# Patient Record
Sex: MALE | Race: WHITE | URBAN - METROPOLITAN AREA
[De-identification: names, ages, dates, MRNs, and addresses within clinical notes are randomized per-mention and may not be internally consistent; named-entity substitution may affect disease eponyms.]

---

## 2017-02-04 ENCOUNTER — APPOINTMENT (OUTPATIENT)
Dept: CT IMAGING | Facility: CLINIC | Age: 47
End: 2017-02-04
Attending: EMERGENCY MEDICINE

## 2017-02-04 ENCOUNTER — HOSPITAL ENCOUNTER (EMERGENCY)
Facility: CLINIC | Age: 47
Discharge: HOME | End: 2017-02-04
Attending: EMERGENCY MEDICINE | Admitting: EMERGENCY MEDICINE

## 2017-02-04 VITALS
OXYGEN SATURATION: 95 % | DIASTOLIC BLOOD PRESSURE: 108 MMHG | TEMPERATURE: 97.1 F | HEART RATE: 104 BPM | SYSTOLIC BLOOD PRESSURE: 178 MMHG | RESPIRATION RATE: 18 BRPM

## 2017-02-04 DIAGNOSIS — R19.7 DIARRHEA, UNSPECIFIED TYPE: ICD-10-CM

## 2017-02-04 DIAGNOSIS — F15.10 METHAMPHETAMINE ABUSE (H): ICD-10-CM

## 2017-02-04 LAB
ALBUMIN SERPL-MCNC: 3.5 G/DL (ref 3.4–5)
ALBUMIN UR-MCNC: NEGATIVE MG/DL
ALP SERPL-CCNC: 89 U/L (ref 40–150)
ALT SERPL W P-5'-P-CCNC: 34 U/L (ref 0–70)
AMPHETAMINES UR QL SCN: ABNORMAL
ANION GAP SERPL CALCULATED.3IONS-SCNC: 6 MMOL/L (ref 3–14)
APPEARANCE UR: CLEAR
AST SERPL W P-5'-P-CCNC: 18 U/L (ref 0–45)
BARBITURATES UR QL: ABNORMAL
BASOPHILS # BLD AUTO: 0 10E9/L (ref 0–0.2)
BASOPHILS NFR BLD AUTO: 0.2 %
BENZODIAZ UR QL: ABNORMAL
BILIRUB SERPL-MCNC: 0.3 MG/DL (ref 0.2–1.3)
BILIRUB UR QL STRIP: NEGATIVE
BUN SERPL-MCNC: 11 MG/DL (ref 7–30)
CALCIUM SERPL-MCNC: 8.4 MG/DL (ref 8.5–10.1)
CANNABINOIDS UR QL SCN: ABNORMAL
CHLORIDE SERPL-SCNC: 105 MMOL/L (ref 94–109)
CO2 SERPL-SCNC: 27 MMOL/L (ref 20–32)
COCAINE UR QL: ABNORMAL
COLOR UR AUTO: YELLOW
CREAT SERPL-MCNC: 1.16 MG/DL (ref 0.66–1.25)
DIFFERENTIAL METHOD BLD: NORMAL
EOSINOPHIL # BLD AUTO: 0.2 10E9/L (ref 0–0.7)
EOSINOPHIL NFR BLD AUTO: 2.3 %
ERYTHROCYTE [DISTWIDTH] IN BLOOD BY AUTOMATED COUNT: 13.4 % (ref 10–15)
GFR SERPL CREATININE-BSD FRML MDRD: 67 ML/MIN/1.7M2
GLUCOSE SERPL-MCNC: 88 MG/DL (ref 70–99)
GLUCOSE UR STRIP-MCNC: NEGATIVE MG/DL
HCT VFR BLD AUTO: 45.8 % (ref 40–53)
HGB BLD-MCNC: 15.8 G/DL (ref 13.3–17.7)
HGB UR QL STRIP: NEGATIVE
IMM GRANULOCYTES # BLD: 0.1 10E9/L (ref 0–0.4)
IMM GRANULOCYTES NFR BLD: 0.6 %
KETONES UR STRIP-MCNC: NEGATIVE MG/DL
LACTATE BLD-SCNC: 1.9 MMOL/L (ref 0.7–2.1)
LEUKOCYTE ESTERASE UR QL STRIP: NEGATIVE
LIPASE SERPL-CCNC: 126 U/L (ref 73–393)
LYMPHOCYTES # BLD AUTO: 1.5 10E9/L (ref 0.8–5.3)
LYMPHOCYTES NFR BLD AUTO: 14.4 %
MCH RBC QN AUTO: 30.6 PG (ref 26.5–33)
MCHC RBC AUTO-ENTMCNC: 34.5 G/DL (ref 31.5–36.5)
MCV RBC AUTO: 89 FL (ref 78–100)
MONOCYTES # BLD AUTO: 1 10E9/L (ref 0–1.3)
MONOCYTES NFR BLD AUTO: 9.4 %
NEUTROPHILS # BLD AUTO: 7.6 10E9/L (ref 1.6–8.3)
NEUTROPHILS NFR BLD AUTO: 73.1 %
NITRATE UR QL: NEGATIVE
OPIATES UR QL SCN: ABNORMAL
PCP UR QL SCN: ABNORMAL
PH UR STRIP: 5.5 PH (ref 5–7)
PLATELET # BLD AUTO: 234 10E9/L (ref 150–450)
POTASSIUM SERPL-SCNC: 4 MMOL/L (ref 3.4–5.3)
PROT SERPL-MCNC: 6.9 G/DL (ref 6.8–8.8)
RBC # BLD AUTO: 5.16 10E12/L (ref 4.4–5.9)
SODIUM SERPL-SCNC: 138 MMOL/L (ref 133–144)
SP GR UR STRIP: 1.01 (ref 1–1.03)
URN SPEC COLLECT METH UR: NORMAL
UROBILINOGEN UR STRIP-MCNC: NORMAL MG/DL (ref 0–2)
WBC # BLD AUTO: 10.4 10E9/L (ref 4–11)

## 2017-02-04 PROCEDURE — 25500064 ZZH RX 255 OP 636: Performed by: EMERGENCY MEDICINE

## 2017-02-04 PROCEDURE — 81003 URINALYSIS AUTO W/O SCOPE: CPT | Performed by: EMERGENCY MEDICINE

## 2017-02-04 PROCEDURE — 25000128 H RX IP 250 OP 636: Performed by: EMERGENCY MEDICINE

## 2017-02-04 PROCEDURE — 96360 HYDRATION IV INFUSION INIT: CPT

## 2017-02-04 PROCEDURE — 83605 ASSAY OF LACTIC ACID: CPT | Performed by: EMERGENCY MEDICINE

## 2017-02-04 PROCEDURE — 99284 EMERGENCY DEPT VISIT MOD MDM: CPT | Mod: 25

## 2017-02-04 PROCEDURE — 74177 CT ABD & PELVIS W/CONTRAST: CPT

## 2017-02-04 PROCEDURE — 80053 COMPREHEN METABOLIC PANEL: CPT | Performed by: EMERGENCY MEDICINE

## 2017-02-04 PROCEDURE — 99284 EMERGENCY DEPT VISIT MOD MDM: CPT | Performed by: EMERGENCY MEDICINE

## 2017-02-04 PROCEDURE — 85025 COMPLETE CBC W/AUTO DIFF WBC: CPT | Performed by: EMERGENCY MEDICINE

## 2017-02-04 PROCEDURE — 25000125 ZZHC RX 250: Performed by: EMERGENCY MEDICINE

## 2017-02-04 PROCEDURE — 80307 DRUG TEST PRSMV CHEM ANLYZR: CPT | Performed by: EMERGENCY MEDICINE

## 2017-02-04 PROCEDURE — 83690 ASSAY OF LIPASE: CPT | Performed by: EMERGENCY MEDICINE

## 2017-02-04 RX ORDER — IOPAMIDOL 755 MG/ML
100 INJECTION, SOLUTION INTRAVASCULAR ONCE
Status: COMPLETED | OUTPATIENT
Start: 2017-02-04 | End: 2017-02-04

## 2017-02-04 RX ORDER — SODIUM CHLORIDE 9 MG/ML
1000 INJECTION, SOLUTION INTRAVENOUS CONTINUOUS
Status: DISCONTINUED | OUTPATIENT
Start: 2017-02-04 | End: 2017-02-04 | Stop reason: HOSPADM

## 2017-02-04 RX ADMIN — SODIUM CHLORIDE 67 ML: 9 INJECTION, SOLUTION INTRAVENOUS at 20:16

## 2017-02-04 RX ADMIN — IOPAMIDOL 100 ML: 755 INJECTION, SOLUTION INTRAVENOUS at 20:16

## 2017-02-04 RX ADMIN — SODIUM CHLORIDE 1000 ML: 9 INJECTION, SOLUTION INTRAVENOUS at 17:55

## 2017-02-04 NOTE — ED AVS SNAPSHOT
[facility] Emergency Department    [address] [address]    Phone:  [phone]    Fax:  [phone]                                       Xavier Arellano   MRN: [id number]    Department:  [facility] Emergency Department   Date of Visit:  2/4/2017           Patient Information     Date Of Birth          1970        Your diagnoses for this visit were:     Diarrhea, unspecified type     Methamphetamine abuse        You were seen by George Peterson MD.      Follow-up Information     Follow up with None.    Why:  As needed      Discharge References/Attachments     DIARRHEA, UNK CAUSE (ADULT) (ENGLISH)    METHAMPHETAMINE ABUSE AND ADDICTION, UNDERSTANDING (ENGLISH)      24 Hour Appointment Hotline       To make an appointment at any [facility] clinic, call [phone]). If you don't have a family doctor or clinic, we will help you find one. [facility] clinics are conveniently located to serve the needs of you and your family.             Review of your medicines      Notice     You have not been prescribed any medications.            Procedures and tests performed during your visit     CBC with platelets differential    CT Abdomen Pelvis w Contrast    Comprehensive metabolic panel    Drug abuse screen 77 urine ([place])    Lactic acid whole blood    Lipase    UA reflex to Microscopic      Orders Needing Specimen Collection     Ordered          02/04/17 1732  Enteric Bacteria and Virus Panel by MORRIS Stool - ROUTINE, Prio: Routine, Needs to be Collected     Scheduled Task Status   02/04/17 1733 Collect Enteric Bacteria and Virus Panel by MORRIS Stool Open   Order Class:  PCU Collect                02/04/17 1732  Clostridium difficile toxin B PCR - ROUTINE, Prio: Routine, Needs to be Collected     Scheduled Task Status   02/04/17 1733 Collect Clostridium difficile toxin B PCR Open   Order Class:  PCU Collect                  Pending Results     Date and Time Order Name Status  Description    2/4/2017 1937 CT Abdomen Pelvis w Contrast Preliminary             Pending Culture Results     No orders found from 2/3/2017 to 2/5/2017.       Test Results from your hospital stay           2/4/2017  6:14 PM - Interface, Flexilab Results      Component Results     Component Value Ref Range & Units Status    WBC 10.4 4.0 - 11.0 10e9/L Final    RBC Count 5.16 4.4 - 5.9 10e12/L Final    Hemoglobin 15.8 13.3 - 17.7 g/dL Final    Hematocrit 45.8 40.0 - 53.0 % Final    MCV 89 78 - 100 fl Final    MCH 30.6 26.5 - 33.0 pg Final    MCHC 34.5 31.5 - 36.5 g/dL Final    RDW 13.4 10.0 - 15.0 % Final    Platelet Count 234 150 - 450 10e9/L Final    Diff Method Automated Method  Final    % Neutrophils 73.1 % Final    % Lymphocytes 14.4 % Final    % Monocytes 9.4 % Final    % Eosinophils 2.3 % Final    % Basophils 0.2 % Final    % Immature Granulocytes 0.6 % Final    Absolute Neutrophil 7.6 1.6 - 8.3 10e9/L Final    Absolute Lymphocytes 1.5 0.8 - 5.3 10e9/L Final    Absolute Monocytes 1.0 0.0 - 1.3 10e9/L Final    Absolute Eosinophils 0.2 0.0 - 0.7 10e9/L Final    Absolute Basophils 0.0 0.0 - 0.2 10e9/L Final    Abs Immature Granulocytes 0.1 0 - 0.4 10e9/L Final         2/4/2017  6:31 PM - Interface, Flexilab Results      Component Results     Component Value Ref Range & Units Status    Sodium 138 133 - 144 mmol/L Final    Potassium 4.0 3.4 - 5.3 mmol/L Final    Chloride 105 94 - 109 mmol/L Final    Carbon Dioxide 27 20 - 32 mmol/L Final    Anion Gap 6 3 - 14 mmol/L Final    Glucose 88 70 - 99 mg/dL Final    Urea Nitrogen 11 7 - 30 mg/dL Final    Creatinine 1.16 0.66 - 1.25 mg/dL Final    GFR Estimate 67 >60 mL/min/1.7m2 Final    Non  GFR Calc    GFR Estimate If Black 81 >60 mL/min/1.7m2 Final    African American GFR Calc    Calcium 8.4 (L) 8.5 - 10.1 mg/dL Final    Bilirubin Total 0.3 0.2 - 1.3 mg/dL Final    Albumin 3.5 3.4 - 5.0 g/dL Final    Protein Total 6.9 6.8 - 8.8 g/dL Final    Alkaline  Phosphatase 89 40 - 150 U/L Final    ALT 34 0 - 70 U/L Final    AST 18 0 - 45 U/L Final         2/4/2017  6:29 PM - Interface, Flexilab Results      Component Results     Component Value Ref Range & Units Status    Lipase 126 73 - 393 U/L Final         2/4/2017  6:19 PM - Interface, Flexilab Results      Component Results     Component Value Ref Range & Units Status    Lactic Acid 1.9 0.7 - 2.1 mmol/L Final         2/4/2017  7:21 PM - Interface, Flexilab Results      Component Results     Component Value Ref Range & Units Status    Color Urine Yellow  Final    Appearance Urine Clear  Final    Glucose Urine Negative NEG mg/dL Final    Bilirubin Urine Negative NEG Final    Ketones Urine Negative NEG mg/dL Final    Specific Gravity Urine 1.015 1.003 - 1.035 Final    Blood Urine Negative NEG Final    pH Urine 5.5 5.0 - 7.0 pH Final    Protein Albumin Urine Negative NEG mg/dL Final    Urobilinogen mg/dL Normal 0.0 - 2.0 mg/dL Final    Nitrite Urine Negative NEG Final    Leukocyte Esterase Urine Negative NEG Final    Source Midstream Urine  Final         2/4/2017  7:34 PM - Interface, Flexilab Results      Component Results     Component Value Ref Range & Units Status    Amphetamine Qual Urine  NEG Final    Positive   Cutoff for a positive amphetamine is greater than 500 ng/mL. This is an   unconfirmed screening result to be used for medical purposes only.   (A)    Barbiturates Qual Urine  NEG Final    Negative   Cutoff for a negative barbiturate is 200 ng/mL or less.      Benzodiazepine Qual Urine  NEG Final    Negative   Cutoff for a negative benzodiazepine is 200 ng/mL or less.      Cannabinoids Qual Urine  NEG Final    Negative   Cutoff for a negative cannabinoid is 50 ng/mL or less.      Cocaine Qual Urine  NEG Final    Negative   Cutoff for a negative cocaine is 300 ng/mL or less.      Opiates Qualitative Urine  NEG Final    Negative   Cutoff for a negative opiate is 300 ng/mL or less.      PCP Qual Urine  NEG Final     Negative   Cutoff for a negative PCP is 25 ng/mL or less.           2/4/2017  8:35 PM - Interface, Radiant Ib      Narrative     CT ABDOMEN AND PELVIS WITH CONTRAST 2/4/2017 8:24 PM     HISTORY: Diarrhea with left lower quadrant and suprapubic abdominal  pain.    COMPARISON: None.    TECHNIQUE: Volumetric helical acquisition of CT images from the lung  bases through the symphysis pubis after the administration of Isovue  370 - 100 mL intravenous contrast. Radiation dose for this scan was  reduced using automated exposure control, adjustment of the mA and/or  kV according to patient size, or iterative reconstruction technique.    FINDINGS: The liver, bilateral kidneys and adrenal glands, pancreas,  and spleen demonstrate no worrisome focal lesion. Mild fatty changes  of liver. Gallbladder appears unremarkable. There are minimal  atherosclerotic changes of the visualized aorta and its branches.  There is no evidence of aortic dissection or aneurysm. There is  minimal scattered diverticulosis without evidence for diverticulitis.  Normal appendix. No hydronephrosis. Small fat-containing periumbilical  hernia without stranding. There is no free fluid in the abdomen or  pelvis. No free air in the abdomen. Bone windows reveal no destructive  lesions. There are no abdominal or pelvic lymph nodes that are  abnormal by size criteria. The visualized lung bases are unremarkable.  There are no dilated loops of small bowel or colon.        Impression     IMPRESSION: No acute process demonstrated within the abdomen and  pelvis.                   Thank you for choosing [facility]       Thank you for choosing [facility] for your care. Our goal is always to provide you with excellent care. Hearing back from our patients is one way we can continue to improve our services. Please take a few minutes to complete the written survey that you may receive in the mail after you visit with us. Thank you!        MyChart Information     [employer]  "lets you send messages to your doctor, view your test results, renew your prescriptions, schedule appointments and more. To sign up, go to [email or web address] . Click on \"Log in\" on the left side of the screen, which will take you to the Welcome page. Then click on \"Sign up Now\" on the right side of the page.     You will be asked to enter the access code listed below, as well as some personal information. Please follow the directions to create your username and password.     Your access code is: [email or web address]  Expires: 2017  8:49 PM     Your access code will  in 90 days. If you need help or a new code, please call your [facility] clinic or [phone].        Care EveryWhere ID     This is your Care EveryWhere ID. This could be used by other organizations to access your [facility] medical records  [id number]        After Visit Summary       This is your record. Keep this with you and show to your community pharmacist(s) and doctor(s) at your next visit.                  "

## 2017-02-04 NOTE — ED NOTES
Pt c/o sulfur smelling burps - states worse today - also c/o diarrhea - denies any vomiting or abdominal pain at this time.

## 2017-02-04 NOTE — ED PROVIDER NOTES
History     Chief Complaint   Patient presents with     Diarrhea     Pt c/o sulfur smelling burps - states worse today - also c/o diarrhea - denies any vomiting or abdominal pain at this time.     HPI  Xavier Arellano is a 46 year old male who presents with one-week history of intermittent diarrheal stools.  Patient states he's had watery but nonbloody stools over the last week.  He had 2 days of multiple diarrheal stools and improvement for a couple of days.  The diarrhea has no reoccurred and he has associated moderate suprapubic/left lower quadrant pain just before he has a diarrheal stool.  Denies fever or chills.  No back pain.  No radiation into the groin.  Previous history of kidney stone requiring lithotripsy.  No urinary symptoms or flank pain today.  He denies nausea or vomiting but states his burps smell like sulfur.  He feels more gassy than usual.  Patient denies lightheadedness or generalized weakness.  No exposure to infectious illness.  No recent travel.  No antibiotic use.  No history of C. diff.  He's has never had a colonoscopy.  Denies personal or family history of inflammatory bowel disease, colon cancer, or diverticular disease.  Patient has a history of acid reflux but is currently on no medicines for this.  He said upper endoscopy which did show scarring from reflux requiring dilatation.  No personal history of biliary disease.  Patient also admits to hypertension previously but is not on medicines currently.  No history of TIA or stroke.  Denies chest pain or shortness of breath.  He is a daily smoker.  No treatment for his diarrhea prior to arrival.    I have reviewed the Medications, Allergies, Past Medical and Surgical History, and Social History in the Epic system.    Review of Systems all other systems reviewed and were negative.  History reviewed. No pertinent past medical history.  There is no problem list on file for this patient.    Current Facility-Administered Medications    Medication     0.9% sodium chloride infusion     No current outpatient prescriptions on file.      No Known Allergies  Social History     Social History     Marital Status: Single     Spouse Name: N/A     Number of Children: N/A     Years of Education: N/A     Occupational History     Not on file.     Social History Main Topics     Smoking status: Current Every Day Smoker -- 0.50 packs/day     Smokeless tobacco: Not on file     Alcohol Use: No     Drug Use: No     Sexual Activity: Not on file     Other Topics Concern     Not on file     Social History Narrative     History reviewed. No pertinent family history.    Physical Exam   BP: (!) 141/111 mmHg  Pulse: 114  Temp: 97.1  F (36.2  C)  Resp: 20  SpO2: 98 %  Physical Exam Gen. alert cooperative male in mild to moderate distress.  HEENT reveals no scleral icterus.  Nasal passages are patent.  Oral mucosa is moist.  Neck is supple.  Lungs are clear without adventitious sounds.  Cardiac regular rate without murmur.  Back there is no CVA tenderness.  Abdomen reveals active bowel sounds.  He is obese.  On palpation I cannot reproduce patient's above-described pain.  There is no organomegaly or masses.  No skin rash over the flank or abdomen.  Extremities reveal no edema, calf or thigh tenderness.    ED Course   Procedures           Results for orders placed or performed during the hospital encounter of 02/04/17   CT Abdomen Pelvis w Contrast    Narrative    CT ABDOMEN AND PELVIS WITH CONTRAST 2/4/2017 8:24 PM     HISTORY: Diarrhea with left lower quadrant and suprapubic abdominal  pain.    COMPARISON: None.    TECHNIQUE: Volumetric helical acquisition of CT images from the lung  bases through the symphysis pubis after the administration of Isovue  370 - 100 mL intravenous contrast. Radiation dose for this scan was  reduced using automated exposure control, adjustment of the mA and/or  kV according to patient size, or iterative reconstruction technique.    FINDINGS:  The liver, bilateral kidneys and adrenal glands, pancreas,  and spleen demonstrate no worrisome focal lesion. Mild fatty changes  of liver. Gallbladder appears unremarkable. There are minimal  atherosclerotic changes of the visualized aorta and its branches.  There is no evidence of aortic dissection or aneurysm. There is  minimal scattered diverticulosis without evidence for diverticulitis.  Normal appendix. No hydronephrosis. Small fat-containing periumbilical  hernia without stranding. There is no free fluid in the abdomen or  pelvis. No free air in the abdomen. Bone windows reveal no destructive  lesions. There are no abdominal or pelvic lymph nodes that are  abnormal by size criteria. The visualized lung bases are unremarkable.  There are no dilated loops of small bowel or colon.      Impression    IMPRESSION: No acute process demonstrated within the abdomen and  pelvis.            Critical Care time:  none               Labs Ordered and Resulted from Time of ED Arrival Up to the Time of Departure from the ED   COMPREHENSIVE METABOLIC PANEL - Abnormal; Notable for the following:     Calcium 8.4 (*)     All other components within normal limits   DRUG ABUSE SCREEN 77 URINE ([place], [place], SH) - Abnormal; Notable for the following:     Amphetamine Qual Urine   (*)     Value: Positive   Cutoff for a positive amphetamine is greater than 500 ng/mL. This is an   unconfirmed screening result to be used for medical purposes only.      All other components within normal limits   CBC WITH PLATELETS DIFFERENTIAL   LIPASE   LACTIC ACID WHOLE BLOOD   URINE MACROSCOPIC WITH REFLEX TO MICRO   ENTERIC BACTERIA AND VIRUS PANEL BY MORRIS STOOL   CLOSTRIDIUM DIFFICILE TOXIN B     IV established and blood work was obtained.  Stool cultures are ordered.  Urinalysis is ordered.  Patient is currently pain-free so defers pain meds.  At 6:50 PM discussed results patient's blood work showed no acute abnormality.  He was able to provide a urine  sample and results are pending.  No diarrhea so far during the ER encounter.  Discussed results patient's CT showing no distinct cause for his symptoms.  He was still unable to provide a stool sample.  Confront him with his positive amphetamine in his urine does admit to using methamphetamine.  Assessments & Plan (with Medical Decision Making)   Patient is a 46-year-old male presents with complaints of one week of intermittent diarrhea.  Nonbloody in nature.  No history of inflammatory bowel disease or diverticular disease.  No recent travel or exposure to infectious illness.  No antibiotic usage.  No history of C. diff.  No fever, vomiting, or urinary symptoms.  Previous history of kidney stone but normal urinalysis and no flank tenderness on exam today.  He has no fever and normal white count.  With his left lower quadrant and suprapubic tenderness by report a CT is obtained to look for diverticular disease and this was felt to be normal except a small fat-containing periumbilical hernia.  She was noted to be hypertensive and tachycardic during the ER encounter.  His urine tox urine came back positive for amphetamines.  When confronted with this he does admit to using methamphetamine.  He does not think this is an issue for him and does not want to treatment.  I discussed the risks of using this drug including his high blood pressure and tachycardia.  He is given a handout on methamphetamine abuse and addiction.  Patient given a handout on diarrhea.  If he has persistent symptoms consider stool samples as he was not able to provide that here today.  Consider that his diarrhea may be caused by the methamphetamine and its stimulant activity.      I have reviewed the nursing notes.    I have reviewed the findings, diagnosis, plan and need for follow up with the patient.    New Prescriptions    No medications on file       Final diagnoses:   Diarrhea, unspecified type   Methamphetamine abuse       2/4/2017   [place] [facility]  [facility] EMERGENCY DEPARTMENT      George Peterson MD  02/04/17 2050

## 2017-02-04 NOTE — ED AVS SNAPSHOT
[facility] Emergency Department    [address] [address]    Phone:  [phone]    Fax:  [phone]                                       Xavier Arellano   MRN: [id number]    Department:  [facility] Emergency Department   Date of Visit:  2/4/2017           After Visit Summary Signature Page     I have received my discharge instructions, and my questions have been answered. I have discussed any challenges I see with this plan with the nurse or doctor.    ..........................................................................................................................................  Patient/Patient Representative Signature      ..........................................................................................................................................  Patient Representative Print Name and Relationship to Patient    ..................................................               ................................................  Date                                            Time    ..........................................................................................................................................  Reviewed by Signature/Title    ...................................................              ..............................................  Date                                                            Time